# Patient Record
Sex: FEMALE | Race: WHITE | HISPANIC OR LATINO
[De-identification: names, ages, dates, MRNs, and addresses within clinical notes are randomized per-mention and may not be internally consistent; named-entity substitution may affect disease eponyms.]

---

## 2022-09-13 ENCOUNTER — APPOINTMENT (OUTPATIENT)
Dept: ORTHOPEDIC SURGERY | Facility: CLINIC | Age: 27
End: 2022-09-13

## 2022-09-13 VITALS — HEIGHT: 60 IN | WEIGHT: 150 LBS | BODY MASS INDEX: 29.45 KG/M2

## 2022-09-13 DIAGNOSIS — E78.00 PURE HYPERCHOLESTEROLEMIA, UNSPECIFIED: ICD-10-CM

## 2022-09-13 PROBLEM — Z00.00 ENCOUNTER FOR PREVENTIVE HEALTH EXAMINATION: Status: ACTIVE | Noted: 2022-09-13

## 2022-09-13 PROCEDURE — 99203 OFFICE O/P NEW LOW 30 MIN: CPT

## 2022-09-13 PROCEDURE — 73562 X-RAY EXAM OF KNEE 3: CPT | Mod: LT

## 2022-09-13 RX ORDER — ERGOCALCIFEROL (VITAMIN D2) 1250 MCG
50000 CAPSULE ORAL
Refills: 0 | Status: ACTIVE | COMMUNITY

## 2022-09-13 RX ORDER — CETIRIZINE HCL 10 MG
TABLET ORAL
Refills: 0 | Status: ACTIVE | COMMUNITY

## 2022-09-13 NOTE — DISCUSSION/SUMMARY
[de-identified] : generalized ligament laxity. PT. rx: patellar stability brace\par \par ------------------------------------------------------------------------------------------------------------------------------------------------------\par \par The patient was advised of the diagnosis.  The natural history of the pathology was explained in full. All questions were answered.  The risks and benefits of conservative and interventional treatment alternatives were explained to the patient\par \par

## 2022-09-13 NOTE — IMAGING
[de-identified] : \par ------------------------------------------------------------------------------------------------------------------------------------------------------\par \par Left knee exam: \par \par ++ Generalized ligamentous laxity noted on exam\par Inspection: \par    (-) Effusion\par    (-) Malalignment\par    (-) Swelling\par    (-) Quad atrophy\par    (-) J-sign\par ROM: \par    -15 - 140 degrees of flexion.\par Tenderness: \par    (-) MJLT\par    (-) LJLT\par    +Medial patellar facet tenderness\par    (-) Lateral patellar facet tenderness\par    (-) Crepitus\par    (-) Patellar grind tenderness\par    (-) Patellar tendon\par    (-) Quad tendon\par    Other: \par Stability: \par    (-) Lachman\par    (-) Varus/Valgus instability\par    (-) Posterior drawer\par    (-) Patellar translation: 4+ quadrant\par Additional tests: \par    (-) McMurrays test\par    +Patellar apprehension\par    Other: \par Strength: 5/5 Q/H/TA/GS/EHL\par Neuro: In tact to light touch throughout, DTR's wnl\par Vascularity: Extremity warm and well perfused\par Gait: normal\par \par  [Left] : left knee [All Views] : anteroposterior, lateral, skyline, and anteroposterior standing [There are no fractures, subluxations or dislocations. No significant abnormalities are seen] : There are no fractures, subluxations or dislocations. No significant abnormalities are seen

## 2022-09-13 NOTE — HISTORY OF PRESENT ILLNESS
[4] : 4 [1] : 2 [Dull/Aching] : dull/aching [Localized] : localized [Throbbing] : throbbing [Intermittent] : intermittent [Household chores] : household chores [Leisure] : leisure [Work] : work [Rest] : rest [de-identified] : 09/13/2022 Patient states she dislocated her left knee patella in 2016 and recently felt pain while she was "tubing" August 13,2022 /patient states in 2011 she bruised her patella.  feels instability in her knees. no dislocations since. anterior knee pain.\par  [] : no [FreeTextEntry1] : Left Knee  [FreeTextEntry9] : wearing knee brace  [de-identified] : Activity

## 2022-10-27 ENCOUNTER — APPOINTMENT (OUTPATIENT)
Dept: ORTHOPEDIC SURGERY | Facility: CLINIC | Age: 27
End: 2022-10-27

## 2022-10-27 VITALS — BODY MASS INDEX: 29.45 KG/M2 | HEIGHT: 60 IN | WEIGHT: 150 LBS

## 2022-10-27 PROCEDURE — 99213 OFFICE O/P EST LOW 20 MIN: CPT

## 2022-10-27 NOTE — HISTORY OF PRESENT ILLNESS
[3] : 3 [0] : 0 [Dull/Aching] : dull/aching [Localized] : localized [Throbbing] : throbbing [Intermittent] : intermittent [Household chores] : household chores [Leisure] : leisure [Work] : work [Rest] : rest [de-identified] : \par \par 09/13/2022 Patient states she dislocated her left knee patella in 2016 and recently felt pain while she was "tubing" August 13,2022 patient states in 2011 she bruised her patella.  feels instability in her knees. no dislocations since. anterior knee pain.\par  [] : no [FreeTextEntry1] : Left Knee  [FreeTextEntry9] : wearing knee brace  [de-identified] : Activity

## 2022-10-27 NOTE — IMAGING
[Left] : left knee [All Views] : anteroposterior, lateral, skyline, and anteroposterior standing [There are no fractures, subluxations or dislocations. No significant abnormalities are seen] : There are no fractures, subluxations or dislocations. No significant abnormalities are seen [de-identified] : \par ------------------------------------------------------------------------------------------------------------------------------------------------------\par \par Left knee exam: \par \par ++ Generalized ligamentous laxity noted on exam\par Inspection: \par    (-) Effusion\par    (-) Malalignment\par    (-) Swelling\par    (-) Quad atrophy\par    (-) J-sign\par ROM: \par    -15 - 140 degrees of flexion.\par Tenderness: \par    (-) MJLT\par    (-) LJLT\par    +Medial patellar facet tenderness\par    (-) Lateral patellar facet tenderness\par    (-) Crepitus\par    (-) Patellar grind tenderness\par    (-) Patellar tendon\par    (-) Quad tendon\par    Other: \par Stability: \par    (-) Lachman\par    (-) Varus/Valgus instability\par    (-) Posterior drawer\par    (-) Patellar translation: 4+ quadrant\par Additional tests: \par    (-) McMurrays test\par    +Patellar apprehension\par    Other: \par Strength: 5/5 Q/H/TA/GS/EHL\par Neuro: In tact to light touch throughout, DTR's wnl\par Vascularity: Extremity warm and well perfused\par Gait: normal\par \par

## 2022-10-27 NOTE — REASON FOR VISIT
[FreeTextEntry2] : 10/27/22: f/u left knee instability. She has been wearing brace, but using it when more active or in a less known location. Doing PT 2x a week and HEP. She is making slow progress.

## 2022-10-27 NOTE — DISCUSSION/SUMMARY
[de-identified] : generalized ligament laxity. PT. rx: patellar stability brace. continue PT, taping. \par \par ------------------------------------------------------------------------------------------------------------------------------------------------------\par \par The patient was advised of the diagnosis.  The natural history of the pathology was explained in full. All questions were answered.  The risks and benefits of conservative and interventional treatment alternatives were explained to the patient\par \par

## 2022-12-06 ENCOUNTER — APPOINTMENT (OUTPATIENT)
Dept: ORTHOPEDIC SURGERY | Facility: CLINIC | Age: 27
End: 2022-12-06

## 2022-12-06 PROCEDURE — 99213 OFFICE O/P EST LOW 20 MIN: CPT

## 2022-12-06 NOTE — IMAGING
[de-identified] : \par ------------------------------------------------------------------------------------------------------------------------------------------------------\par \par Left knee exam: \par \par ++ Generalized ligamentous laxity noted on exam\par Inspection: \par    (-) Effusion\par    (-) Malalignment\par    (-) Swelling\par    (-) Quad atrophy\par    (-) J-sign\par ROM: \par    -15 - 140 degrees of flexion.\par Tenderness: \par    (-) MJLT\par    (-) LJLT\par    +Medial patellar facet tenderness\par    (-) Lateral patellar facet tenderness\par    (-) Crepitus\par    (-) Patellar grind tenderness\par    (-) Patellar tendon\par    (-) Quad tendon\par    Other: \par Stability: \par    (-) Lachman\par    (-) Varus/Valgus instability\par    (-) Posterior drawer\par    (-) Patellar translation: 4+ quadrant\par Additional tests: \par    (-) McMurrays test\par    +Patellar apprehension\par    Other: \par Strength: 5/5 Q/H/TA/GS/EHL\par Neuro: In tact to light touch throughout, DTR's wnl\par Vascularity: Extremity warm and well perfused\par Gait: normal\par \par

## 2022-12-06 NOTE — DISCUSSION/SUMMARY
[de-identified] : generalized ligament laxity. continue PT. f 6 wk prn\par \par ------------------------------------------------------------------------------------------------------------------------------------------------------\par \par The patient was advised of the diagnosis.  The natural history of the pathology was explained in full. All questions were answered.  The risks and benefits of conservative and interventional treatment alternatives were explained to the patient\par \par

## 2022-12-06 NOTE — REASON FOR VISIT
[FreeTextEntry2] : f/u left knee instability. She has been wearing brace, but using it when more active or in a less known location. Doing PT 2x a week and HEP. She feels better, stronger, but still some mild instability

## 2022-12-06 NOTE — HISTORY OF PRESENT ILLNESS
[2] : 2 [0] : 0 [Dull/Aching] : dull/aching [Localized] : localized [Throbbing] : throbbing [Intermittent] : intermittent [Household chores] : household chores [Leisure] : leisure [Work] : work [Rest] : rest [de-identified] : \par \par 09/13/2022 Patient states she dislocated her left knee patella in 2016 and recently felt pain while she was "tubing" August 13,2022 patient states in 2011 she bruised her patella.  feels instability in her knees. no dislocations since. anterior knee pain.\par  [] : no [FreeTextEntry1] : Left Knee  [FreeTextEntry9] : wearing knee brace  [de-identified] : Activity  [de-identified] : 12/06/2022 [de-identified] : physical therapy

## 2023-01-17 ENCOUNTER — APPOINTMENT (OUTPATIENT)
Dept: ORTHOPEDIC SURGERY | Facility: CLINIC | Age: 28
End: 2023-01-17
Payer: MEDICAID

## 2023-01-17 VITALS — HEIGHT: 60 IN | WEIGHT: 150 LBS | BODY MASS INDEX: 29.45 KG/M2

## 2023-01-17 PROCEDURE — 99214 OFFICE O/P EST MOD 30 MIN: CPT

## 2023-01-17 NOTE — HISTORY OF PRESENT ILLNESS
[6] : 6 [4] : 4 [Dull/Aching] : dull/aching [Localized] : localized [Throbbing] : throbbing [Intermittent] : intermittent [Household chores] : household chores [Leisure] : leisure [Work] : work [Rest] : rest [de-identified] : \par \par 09/13/2022 Patient states she dislocated her left knee patella in 2016 and recently felt pain while she was "tubing" August 13,2022 patient states in 2011 she bruised her patella.  feels instability in her knees. no dislocations since. anterior knee pain.\par f/u left knee instability. She has been wearing brace, but using it when more active or in a less known location. Doing PT 2x a week and HEP. She feels better, stronger, but still some mild instability [] : no [FreeTextEntry1] : Left Knee  [FreeTextEntry9] : wearing knee brace  [de-identified] : Activity  [de-identified] : 12/06/2022 [de-identified] : physical therapy

## 2023-01-17 NOTE — DISCUSSION/SUMMARY
[de-identified] : generalized ligament laxity. continue PT. she contiues to have some lack of confidence and has concern about her long term knee stability and use. \par mri eval for possible surgical managemnet. discussed surgery options in detail. \par \par ------------------------------------------------------------------------------------------------------------------------------------------------------\par \par The patient was advised of the diagnosis.  The natural history of the pathology was explained in full. All questions were answered.  The risks and benefits of conservative and interventional treatment alternatives were explained to the patient\par \par

## 2023-01-17 NOTE — IMAGING
[de-identified] : \par ------------------------------------------------------------------------------------------------------------------------------------------------------\par \par Left knee exam: \par \par ++ Generalized ligamentous laxity noted on exam\par Inspection: \par    (-) Effusion\par    (-) Malalignment\par    (-) Swelling\par    (-) Quad atrophy\par    (-) J-sign\par ROM: \par    -15 - 140 degrees of flexion.\par Tenderness: \par    (-) MJLT\par    (-) LJLT\par    +Medial patellar facet tenderness\par    (-) Lateral patellar facet tenderness\par    (-) Crepitus\par    (-) Patellar grind tenderness\par    (-) Patellar tendon\par    (-) Quad tendon\par    Other: \par Stability: \par    (-) Lachman\par    (-) Varus/Valgus instability\par    (-) Posterior drawer\par    (-) Patellar translation: 4+ quadrant\par Additional tests: \par    (-) McMurrays test\par    +Patellar apprehension\par    Other: \par Strength: 5/5 Q/H/TA/GS/EHL\par Neuro: In tact to light touch throughout, DTR's wnl\par Vascularity: Extremity warm and well perfused\par Gait: normal\par \par

## 2023-01-19 ENCOUNTER — TRANSCRIPTION ENCOUNTER (OUTPATIENT)
Age: 28
End: 2023-01-19

## 2023-01-26 ENCOUNTER — FORM ENCOUNTER (OUTPATIENT)
Age: 28
End: 2023-01-26

## 2023-01-27 ENCOUNTER — APPOINTMENT (OUTPATIENT)
Dept: MRI IMAGING | Facility: CLINIC | Age: 28
End: 2023-01-27
Payer: MEDICAID

## 2023-01-27 PROCEDURE — 73721 MRI JNT OF LWR EXTRE W/O DYE: CPT | Mod: LT

## 2023-02-03 ENCOUNTER — APPOINTMENT (OUTPATIENT)
Dept: ORTHOPEDIC SURGERY | Facility: CLINIC | Age: 28
End: 2023-02-03
Payer: MEDICAID

## 2023-02-03 VITALS — BODY MASS INDEX: 29.45 KG/M2 | WEIGHT: 150 LBS | HEIGHT: 60 IN

## 2023-02-03 DIAGNOSIS — M24.20 DISORDER OF LIGAMENT, UNSPECIFIED SITE: ICD-10-CM

## 2023-02-03 PROCEDURE — 99214 OFFICE O/P EST MOD 30 MIN: CPT

## 2023-02-03 RX ORDER — NAPROXEN 500 MG/1
500 TABLET ORAL
Qty: 30 | Refills: 0 | Status: ACTIVE | COMMUNITY
Start: 2023-02-03 | End: 1900-01-01

## 2023-02-09 PROBLEM — M24.20 LIGAMENT LAXITY: Status: ACTIVE | Noted: 2022-09-13

## 2023-02-09 NOTE — IMAGING
[de-identified] : \par ------------------------------------------------------------------------------------------------------------------------------------------------------\par \par Left knee exam: \par \par ++ Generalized ligamentous laxity noted on exam\par Inspection: \par    (-) Effusion\par    (-) Malalignment\par    (-) Swelling\par    (-) Quad atrophy\par    (-) J-sign\par ROM: \par    -15 - 140 degrees of flexion.\par Tenderness: \par    (-) MJLT\par    (-) LJLT\par    +Medial patellar facet tenderness\par    (-) Lateral patellar facet tenderness\par    (-) Crepitus\par    (-) Patellar grind tenderness\par    (-) Patellar tendon\par    (-) Quad tendon\par    Other: \par Stability: \par    (-) Lachman\par    (-) Varus/Valgus instability\par    (-) Posterior drawer\par    (-) Patellar translation: 4+ quadrant\par Additional tests: \par    (-) McMurrays test\par    +Patellar apprehension\par    Other: \par Strength: 5/5 Q/H/TA/GS/EHL\par Neuro: In tact to light touch throughout, DTR's wnl\par Vascularity: Extremity warm and well perfused\par Gait: normal\par \par

## 2023-02-09 NOTE — DATA REVIEWED
[MRI] : MRI [Left] : left [Knee] : knee [FreeTextEntry1] : 1. Slight lateral patella subluxation and slight patellofemoral effusion/synovitis.\par 2. Slight sprain of the inferior medial patellofemoral ligament at the patellar attachment.\par 3. No evidence of acute osseous injury, meniscal tear, chondral defect or loose body.

## 2023-02-09 NOTE — HISTORY OF PRESENT ILLNESS
[4] : 4 [0] : 0 [Dull/Aching] : dull/aching [Localized] : localized [Throbbing] : throbbing [Intermittent] : intermittent [Household chores] : household chores [Leisure] : leisure [Work] : work [Rest] : rest [de-identified] : \par \par 09/13/2022 Patient states she dislocated her left knee patella in 2016 and recently felt pain while she was "tubing" August 13,2022 patient states in 2011 she bruised her patella.  feels instability in her knees. no dislocations since. anterior knee pain.\par f/u left knee instability. She has been wearing brace, but using it when more active or in a less known location. Doing PT 2x a week and HEP. She feels better, stronger, but still some mild instability [] : no [FreeTextEntry1] : Left Knee  [FreeTextEntry9] : wearing knee brace  [de-identified] : Activity  [de-identified] : 12/06/2022 [de-identified] : physical therapy

## 2023-02-09 NOTE — DISCUSSION/SUMMARY
[de-identified] : Continue PT. Will follow up with Dr villaseñor to discuss surgical intervention options in detail. \par \par \par

## 2023-02-20 ENCOUNTER — APPOINTMENT (OUTPATIENT)
Dept: ORTHOPEDIC SURGERY | Facility: CLINIC | Age: 28
End: 2023-02-20
Payer: MEDICAID

## 2023-02-20 VITALS — BODY MASS INDEX: 29.45 KG/M2 | WEIGHT: 150 LBS | HEIGHT: 60 IN

## 2023-02-20 PROCEDURE — 99214 OFFICE O/P EST MOD 30 MIN: CPT

## 2023-02-20 RX ORDER — NAPROXEN 500 MG/1
500 TABLET ORAL TWICE DAILY
Qty: 60 | Refills: 0 | Status: ACTIVE | COMMUNITY
Start: 2023-02-20 | End: 1900-01-01

## 2023-02-20 NOTE — HISTORY OF PRESENT ILLNESS
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 has seen Dr Schmidt got an MRI \par The pain is located anterior, medial, deep  \par The pain is associated with clicking, buckling  \par Worse with activity and better at rest.\par Has tried ice, brace, PT at [facility] in [place], has seen Dr Schmidt got an MRI \par

## 2023-02-20 NOTE — ASSESSMENT
[FreeTextEntry1] : mri left knee 1/27/23 - lat patella tilt, slight eff, synov, mpfl sprain, TT-TG 11mm\par \par - We discussed their diagnosis and treatment options at length including the risks and benefits of both surgical and non-surgical options.\par - We will continue conservative treatment with icing, anti-inflammatory medication, and PT \par - PT for quad/VMO strengthening and progress to dynamic core and glut exercises\par - Patella stablization brace \par - If they do not make an appropriate improvement with conservative treatment we discussed the possibility of surgical intervention in the future.\par - Napryn rx\par - Patient was given a prescription for an anti-inflammatory medication.  They will take it for the next week and then on an as needed basis, as long as there are no medical contra-indications.  Patient is counseled on possible GI, renal, and cardiovascular side effects.\par - Follow up in 6 weeks to re-evaluate.\par

## 2023-02-20 NOTE — IMAGING
[de-identified] : \par LEFT KNEE\par Inspection:  minimal effusion\par Palpation: medial facet of the patella tenderness \par Knee Range of Motion:  0-135\par Strength: 5/5 Quadriceps strength, 5/5 Hamstring strength, 4/5 Hip Abductor strength\par Neurological: light touch is intact throughout\par Ligament Stability and Special Tests: \par McMurrays: neg\par Lachman: neg\par Pivot Shift: neg\par Posterior Drawer: neg\par Valgus: neg\par Varus: neg\par Patella Apprehension: neg\par Patella Maltracking: pos\par \par

## 2023-02-21 ENCOUNTER — APPOINTMENT (OUTPATIENT)
Dept: ORTHOPEDIC SURGERY | Facility: CLINIC | Age: 28
End: 2023-02-21

## 2023-04-12 ENCOUNTER — APPOINTMENT (OUTPATIENT)
Dept: ORTHOPEDIC SURGERY | Facility: CLINIC | Age: 28
End: 2023-04-12
Payer: MEDICAID

## 2023-04-12 VITALS — HEIGHT: 60 IN | BODY MASS INDEX: 29.45 KG/M2 | WEIGHT: 150 LBS

## 2023-04-12 DIAGNOSIS — S83.005A UNSPECIFIED DISLOCATION OF LEFT PATELLA, INITIAL ENCOUNTER: ICD-10-CM

## 2023-04-12 PROCEDURE — J3490M: CUSTOM

## 2023-04-12 PROCEDURE — 20610 DRAIN/INJ JOINT/BURSA W/O US: CPT | Mod: 50

## 2023-04-12 PROCEDURE — 99214 OFFICE O/P EST MOD 30 MIN: CPT | Mod: 25

## 2023-04-12 RX ORDER — NAPROXEN 500 MG/1
500 TABLET ORAL TWICE DAILY
Qty: 60 | Refills: 0 | Status: ACTIVE | COMMUNITY
Start: 2023-04-12 | End: 1900-01-01

## 2023-04-12 NOTE — IMAGING
[de-identified] : \par LEFT KNEE\par Inspection:  minimal effusion\par Palpation: medial facet of the patella tenderness \par Knee Range of Motion:  0-135\par Strength: 5/5 Quadriceps strength, 5/5 Hamstring strength, 4/5 Hip Abductor strength\par Neurological: light touch is intact throughout\par Ligament Stability and Special Tests: \par McMurrays: neg\par Lachman: neg\par Pivot Shift: neg\par Posterior Drawer: neg\par Valgus: neg\par Varus: neg\par Patella Apprehension: neg\par Patella Maltracking: pos\par \par

## 2023-04-12 NOTE — HISTORY OF PRESENT ILLNESS
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 has seen Dr Schmidt got an MRI \par The pain is located anterior, medial, deep  \par The pain is associated with clicking, buckling  \par Worse with activity and better at rest.\par Has tried ice, brace, PT at [facility] in [place], has seen Dr Schmidt got an MRI \par \par 4/12/23- cont with HEP learned at PT and icing knee, no new instability events\par \par

## 2023-04-12 NOTE — ASSESSMENT
[FreeTextEntry1] : mri left knee 1/27/23 - lat patella tilt, slight eff, synov, mpfl sprain, TT-TG 11mm\par \par - We discussed their diagnosis and treatment options at length including the risks and benefits of both surgical and non-surgical options.\par - We will continue conservative treatment with icing, anti-inflammatory medication, and PT \par - PT for quad/VMO strengthening and progress to dynamic core and glut exercises\par - The patient is to continue home exercises learned at physical therapy.\par - If they do not make an appropriate improvement with conservative treatment we discussed the possibility of surgical intervention in the future.\par - Napryn rx\par - Patient was given a prescription for an anti-inflammatory medication.  They will take it for the next week and then on an as needed basis, as long as there are no medical contra-indications.  Patient is counseled on possible GI, renal, and cardiovascular side effects.\par - The patient was advised to let pain guide the gradual advancement of activities.\par

## 2023-05-22 ENCOUNTER — APPOINTMENT (OUTPATIENT)
Dept: ORTHOPEDIC SURGERY | Facility: CLINIC | Age: 28
End: 2023-05-22

## 2024-05-17 ENCOUNTER — APPOINTMENT (OUTPATIENT)
Dept: ORTHOPEDIC SURGERY | Facility: CLINIC | Age: 29
End: 2024-05-17
Payer: COMMERCIAL

## 2024-05-17 VITALS — BODY MASS INDEX: 29.45 KG/M2 | HEIGHT: 60 IN | WEIGHT: 150 LBS

## 2024-05-17 PROCEDURE — 73562 X-RAY EXAM OF KNEE 3: CPT | Mod: LT

## 2024-05-17 PROCEDURE — 99213 OFFICE O/P EST LOW 20 MIN: CPT

## 2024-05-17 RX ORDER — ROSUVASTATIN CALCIUM 5 MG/1
TABLET, FILM COATED ORAL
Refills: 0 | Status: ACTIVE | COMMUNITY

## 2024-05-17 NOTE — DISCUSSION/SUMMARY
[de-identified] : Discussed surgery for patella instability but due to no noted rico dislocation rec proceeding with conservative treatment at this time Plan for PT  Rec use of prev dispensed brace for dynamic activity  ----------------------------------------------------------------------------   All relevant imaging studies pertinent to today's visit, including x-rays, MRI's and/or other advanced imaging studies (CT/etc) were independently interpreted and reviewed with the patient as needed. Implications of the studies together with the patient's clinical picture were discussed to formulate a working diagnosis and management options were detailed.   The patient and/or guardian was advised of the diagnosis.  The natural history of the pathology was explained in full. All questions were answered.  The risks and benefits of conservative and interventional treatment alternatives were explained to the patient  The patient and/or guardian was advised if any advanced diagnostic/imaging study (MRI/CT/etc) is ordered to evaluate potential pathology in the affected area(s), they should follow up in the office to review the results of the study and determine further management that may be indicated.

## 2024-05-17 NOTE — HISTORY OF PRESENT ILLNESS
[Sudden] : sudden [8] : 8 [0] : 0 [Sharp] : sharp [Frequent] : frequent [Rest] : rest [Meds] : meds [Walking] : walking [de-identified] : This is Ms. PAUL SMYTH  a 28 year old female who comes in today complaining of left knee pain since tweaking it at the gym last week.  She has a hx of patella dislocation.  No clear injury. Pt has reported no instances of patella subluxation but did note the knee gave out on her yesterday. [] : no

## 2024-05-17 NOTE — IMAGING
[de-identified] :   ----------------------------------------------------------------------------   Left knee exam:   Skin: no significant pertinent finding  Inspection:     (neg) Effusion     (neg) Malalignment     (neg) Swelling     (neg) Quad atrophy     (neg) J-sign     (+) recurvatum  ROM:     -5 -140  degrees of flexion.  Tenderness:     (neg) MJLT     (neg) LJLT     (neg) Medial patellar facet tenderness     (neg) Lateral patellar facet tenderness     (neg) Crepitus     (neg) Patellar grind tenderness     (neg) Patellar tendon     (neg) Quad tendon     Other:   Stability:     (neg) Lachman     (neg) Varus/Valgus instability     (neg) Posterior drawer     (+) Patellar translation: 4 quadrant translation laterally   Additional tests:     (neg) McMurrays test     (neg) Patellar apprehension     Other:  Strength: 5/5 Q/H/TA/GS/EHL  Neuro: In tact to light touch throughout, DTR's wnl  Vascularity: Extremity warm and well perfused  Gait: normal   [Left] : left knee [FreeTextEntry9] : lateral patella tilt and mild lateral patella tracking, TTG-15

## 2024-07-02 ENCOUNTER — APPOINTMENT (OUTPATIENT)
Dept: ORTHOPEDIC SURGERY | Facility: CLINIC | Age: 29
End: 2024-07-02
Payer: COMMERCIAL

## 2024-07-02 VITALS — WEIGHT: 150 LBS | HEIGHT: 60 IN | BODY MASS INDEX: 29.45 KG/M2

## 2024-07-02 DIAGNOSIS — M25.362 OTHER INSTABILITY, LEFT KNEE: ICD-10-CM

## 2024-07-02 DIAGNOSIS — M22.8X9 OTHER DISORDERS OF PATELLA, UNSPECIFIED KNEE: ICD-10-CM

## 2024-07-02 PROCEDURE — 99214 OFFICE O/P EST MOD 30 MIN: CPT

## 2024-09-03 ENCOUNTER — APPOINTMENT (OUTPATIENT)
Dept: ORTHOPEDIC SURGERY | Facility: CLINIC | Age: 29
End: 2024-09-03
Payer: COMMERCIAL

## 2024-09-03 VITALS — HEIGHT: 60 IN | BODY MASS INDEX: 29.45 KG/M2 | WEIGHT: 150 LBS

## 2024-09-03 DIAGNOSIS — M22.8X9 OTHER DISORDERS OF PATELLA, UNSPECIFIED KNEE: ICD-10-CM

## 2024-09-03 DIAGNOSIS — M25.362 OTHER INSTABILITY, LEFT KNEE: ICD-10-CM

## 2024-09-03 PROCEDURE — 99213 OFFICE O/P EST LOW 20 MIN: CPT

## 2024-09-03 NOTE — HISTORY OF PRESENT ILLNESS
[Sudden] : sudden [6] : 6 [2] : 2 [Sharp] : sharp [Frequent] : frequent [Rest] : rest [Meds] : meds [Walking] : walking [de-identified] : This is Ms. PAUL SMYTH  a 28 year old female who comes in today complaining of left knee pain since tweaking it at the gym last week.  She has a hx of patella dislocation.  No clear injury. Pt has reported no instances of patella subluxation but did note the knee gave out on her yesterday. [] : no [de-identified] : physical therapy ended

## 2024-09-03 NOTE — DISCUSSION/SUMMARY
[de-identified] : Discussed surgical management options for patellar instability Continue bracing w dynamic activity Continue PT / HEP Recommend surgical management if disfunction continues f/u 2-3 mo  ------------------------------------------------------------------  I am writing to request coverage for physical therapy for PAUL SMYTH. They have been diagnosed with patella instability and mal tracking, leading to joint pain and instability. Physical therapy, including ROM and strength exercises, is medically necessary to manage their symptoms, maintain joint function, and improve their overall quality of life.  ----------------------------------------------------------------------------   All relevant imaging studies pertinent to today's visit, including x-rays, MRI's and/or other advanced imaging studies (CT/etc) were independently interpreted and reviewed with the patient as needed. Implications of the studies together with the patient's clinical picture were discussed to formulate a working diagnosis and management options were detailed.   The patient and/or guardian was advised of the diagnosis.  The natural history of the pathology was explained in full. All questions were answered.  The risks and benefits of conservative and interventional treatment alternatives were explained to the patient  The patient and/or guardian was advised if any advanced diagnostic/imaging study (MRI/CT/etc) is ordered to evaluate potential pathology in the affected area(s), they should follow up in the office to review the results of the study and determine further management that may be indicated.

## 2024-09-03 NOTE — IMAGING
[Left] : left knee [FreeTextEntry9] : lateral patella tilt and mild lateral patella tracking, TTG-15   [de-identified] :   ----------------------------------------------------------------------------   Left knee exam:   Skin: no significant pertinent finding  Inspection:     (neg) Effusion     (neg) Malalignment     (neg) Swelling     (neg) Quad atrophy     (neg) J-sign     (+) recurvatum  ROM:     -5 -140  degrees of flexion.  Tenderness:     (neg) MJLT     (neg) LJLT     (neg) Medial patellar facet tenderness     (neg) Lateral patellar facet tenderness     (neg) Crepitus     (neg) Patellar grind tenderness     (neg) Patellar tendon     (neg) Quad tendon     Other:   Stability:     (neg) Lachman     (neg) Varus/Valgus instability     (neg) Posterior drawer     (+) Patellar translation: 4 quadrant translation laterally   Additional tests:     (neg) McMurrays test     (+) Patellar apprehension     Other:  Strength: 5/5 Q/H/TA/GS/EHL  Neuro: In tact to light touch throughout, DTR's wnl  Vascularity: Extremity warm and well perfused  Gait: normal